# Patient Record
Sex: MALE | Race: OTHER | NOT HISPANIC OR LATINO
[De-identification: names, ages, dates, MRNs, and addresses within clinical notes are randomized per-mention and may not be internally consistent; named-entity substitution may affect disease eponyms.]

---

## 2019-04-29 ENCOUNTER — APPOINTMENT (OUTPATIENT)
Dept: OTOLARYNGOLOGY | Facility: CLINIC | Age: 39
End: 2019-04-29
Payer: COMMERCIAL

## 2019-04-29 VITALS
SYSTOLIC BLOOD PRESSURE: 125 MMHG | WEIGHT: 190 LBS | HEIGHT: 70 IN | DIASTOLIC BLOOD PRESSURE: 81 MMHG | HEART RATE: 65 BPM | BODY MASS INDEX: 27.2 KG/M2

## 2019-04-29 DIAGNOSIS — Z91.09 OTHER ALLERGY STATUS, OTHER THAN TO DRUGS AND BIOLOGICAL SUBSTANCES: ICD-10-CM

## 2019-04-29 DIAGNOSIS — H69.82 OTHER SPECIFIED DISORDERS OF EUSTACHIAN TUBE, LEFT EAR: ICD-10-CM

## 2019-04-29 DIAGNOSIS — J34.2 DEVIATED NASAL SEPTUM: ICD-10-CM

## 2019-04-29 DIAGNOSIS — H93.293 OTHER ABNORMAL AUDITORY PERCEPTIONS, BILATERAL: ICD-10-CM

## 2019-04-29 DIAGNOSIS — J31.0 CHRONIC RHINITIS: ICD-10-CM

## 2019-04-29 DIAGNOSIS — Z87.891 PERSONAL HISTORY OF NICOTINE DEPENDENCE: ICD-10-CM

## 2019-04-29 DIAGNOSIS — Z78.9 OTHER SPECIFIED HEALTH STATUS: ICD-10-CM

## 2019-04-29 PROBLEM — Z00.00 ENCOUNTER FOR PREVENTIVE HEALTH EXAMINATION: Status: ACTIVE | Noted: 2019-04-29

## 2019-04-29 PROCEDURE — 31231 NASAL ENDOSCOPY DX: CPT

## 2019-04-29 PROCEDURE — 99203 OFFICE O/P NEW LOW 30 MIN: CPT | Mod: 25

## 2019-04-29 PROCEDURE — 92567 TYMPANOMETRY: CPT

## 2019-04-29 PROCEDURE — 92557 COMPREHENSIVE HEARING TEST: CPT

## 2019-04-29 RX ORDER — FLUTICASONE PROPIONATE 50 MCG
SPRAY, SUSPENSION NASAL
Refills: 0 | Status: ACTIVE | COMMUNITY

## 2019-04-29 RX ORDER — AZELASTINE HYDROCHLORIDE 137 UG/1
0.1 SPRAY, METERED NASAL TWICE DAILY
Qty: 1 | Refills: 3 | Status: ACTIVE | COMMUNITY
Start: 2019-04-29 | End: 1900-01-01

## 2019-04-29 RX ORDER — OMEPRAZOLE 40 MG/1
40 CAPSULE, DELAYED RELEASE ORAL
Refills: 0 | Status: ACTIVE | COMMUNITY

## 2019-04-29 RX ORDER — BALOXAVIR MARBOXIL 40 MG/1
2 X 40 TABLET, FILM COATED ORAL
Qty: 2 | Refills: 0 | Status: ACTIVE | COMMUNITY
Start: 2019-02-12

## 2019-04-29 NOTE — HISTORY OF PRESENT ILLNESS
[de-identified] : LEON CASTRO is a 38 year patient with left nasal congestion nasal obstruction and postnasal drip for the past month.  He also has nasal drainage.  He has no sinus pain or pressure. the symptoms started after he had the flu.  He was treated with Tamiflu.  He has tried a nasal steroid spray, antihistamine, decongestant and oral steroids without improvement.  He has not tried Breathe right strips. He had something similar last year at this time.  He saw ENT then. He has reflux and takes 2 medications for it.  he uses a wedge pillow. he also has left ear pressure. \par \par History of frequent or recurrent sinus infections:  no\par \par Allergy testing:  no\par Nasal/sinus surgery:  no\par Nasal trauma: no\par Reflux or throat symptoms: he has reflux and is on medication for it. He uses a wedge pillow\par \par Pets at home: no\par Secondhand smoke exposure: no\par \par Sinus questionnaire was reviewed\par \par

## 2019-04-29 NOTE — CONSULT LETTER
[Dear  ___] : Dear  [unfilled], [Consult Letter:] : I had the pleasure of evaluating your patient, [unfilled]. [Please see my note below.] : Please see my note below. [Consult Closing:] : Thank you very much for allowing me to participate in the care of this patient.  If you have any questions, please do not hesitate to contact me. [Sincerely,] : Sincerely, [FreeTextEntry3] : Candi Schwartz MD\par

## 2019-04-29 NOTE — ASSESSMENT
[FreeTextEntry1] : He has had a one-month history of chronic nasal congestion and obstruction which is worse in the left side. He had something similar last year at this time. On nasal endoscopy, he has pale boggy mucosa and a deviated septum. There was no obvious infection. He may also have mild nasal valve collapse on the left side. He symptoms are likely due to allergies, reflux and a deviated septum. \par \par he had left ear pressure and sensation of eustachian tube dysfunction.  His ear exam and audiogram were normal. \par \par He has reflux. He is on medication.\par \par \par PLAN\par \par -findings and management options discussed in detail with the patient. \par -Nasal saline rinses, nasal steroid spray (such as fluticasone), antihistamine/decongestant as needed\par -consider trying Breathe Right strips\par -Trial of Astelin\par -Allergy evaluation\par -Consider CT scan of the sinuses\par -Reflux precautions, elevation of the head of bed at night and medication for reflux\par -He could consider nasal procedures to improve his breathing if he fails medical therapy\par -follow up after allergy evaluation\par -call and return earlier if any concerns. \par

## 2021-09-03 ENCOUNTER — TRANSCRIPTION ENCOUNTER (OUTPATIENT)
Age: 41
End: 2021-09-03

## 2023-01-04 ENCOUNTER — NON-APPOINTMENT (OUTPATIENT)
Age: 43
End: 2023-01-04

## 2023-11-12 ENCOUNTER — NON-APPOINTMENT (OUTPATIENT)
Age: 43
End: 2023-11-12

## 2023-11-14 ENCOUNTER — NON-APPOINTMENT (OUTPATIENT)
Age: 43
End: 2023-11-14

## 2023-12-24 ENCOUNTER — NON-APPOINTMENT (OUTPATIENT)
Age: 43
End: 2023-12-24

## 2023-12-26 ENCOUNTER — NON-APPOINTMENT (OUTPATIENT)
Age: 43
End: 2023-12-26